# Patient Record
Sex: MALE | Race: WHITE | NOT HISPANIC OR LATINO | Employment: OTHER | URBAN - METROPOLITAN AREA
[De-identification: names, ages, dates, MRNs, and addresses within clinical notes are randomized per-mention and may not be internally consistent; named-entity substitution may affect disease eponyms.]

---

## 2018-03-26 ENCOUNTER — OFFICE VISIT (OUTPATIENT)
Dept: URGENT CARE | Facility: CLINIC | Age: 43
End: 2018-03-26
Payer: COMMERCIAL

## 2018-03-26 VITALS
DIASTOLIC BLOOD PRESSURE: 90 MMHG | TEMPERATURE: 99.4 F | RESPIRATION RATE: 18 BRPM | HEART RATE: 112 BPM | OXYGEN SATURATION: 98 % | WEIGHT: 243 LBS | SYSTOLIC BLOOD PRESSURE: 140 MMHG | BODY MASS INDEX: 32.91 KG/M2 | HEIGHT: 72 IN

## 2018-03-26 DIAGNOSIS — L03.119 CELLULITIS AND ABSCESS OF HAND: Primary | ICD-10-CM

## 2018-03-26 DIAGNOSIS — L02.519 CELLULITIS AND ABSCESS OF HAND: Primary | ICD-10-CM

## 2018-03-26 PROCEDURE — 99203 OFFICE O/P NEW LOW 30 MIN: CPT | Performed by: FAMILY MEDICINE

## 2018-03-27 NOTE — PROGRESS NOTES
[facility]        NAME: Jonah Wong is a 43 y o  male  : 1975    MRN: [id number]  DATE: 2018  TIME: 8:39 AM    Assessment and Plan   Cellulitis and abscess of hand [L03 119, L02 [id number]]  1  Cellulitis and abscess of hand           Patient Instructions       Follow up with PCP in 3-5 days  Proceed to  ER if symptoms worsen  Chief Complaint     Chief Complaint   Patient presents with    Hand Swelling     Right hand infection, hand broken tooth and small finger lac  had a fever last night and today and his hand has a red hot rash going up his arm  History of Present Illness       Patient states that a few days ago he had a laceration at the tip of his right ring finger, now he has redness and swelling of his knuckle and a red michael progressing up the back of his hand  Review of Systems   Review of Systems   Constitutional: Negative  HENT: Negative  Eyes: Negative  Respiratory: Negative  Cardiovascular: Negative  Gastrointestinal: Negative  Musculoskeletal: Negative  Skin:        Redness and swelling of the right 4th MCP joint with the you red streak progressing up back in the hand  Current Medications     No current outpatient prescriptions on file  Current Allergies     Allergies as of 2018    (No Known Allergies)            The following portions of the patient's history were reviewed and updated as appropriate: allergies, current medications, past family history, past medical history, past social history, past surgical history and problem list      No past medical history on file  No past surgical history on file  No family history on file  Medications have been verified          Objective   /90 (BP Location: Right arm, Patient Position: Sitting, Cuff Size: Large)   Pulse (!) 112   Temp 99 4 °F (37 4 °C)   Resp 18   Ht 6' (1 829 m)   Wt 110 kg (243 lb)   SpO2 98%   BMI 32 96 kg/m²        Physical Exam Physical Exam   Constitutional: He appears well-developed and well-nourished  Cardiovascular: Normal rate and regular rhythm      Pulmonary/Chest: Effort normal and breath sounds normal    Skin:

## 2025-08-04 ENCOUNTER — HOSPITAL ENCOUNTER (EMERGENCY)
Facility: HOSPITAL | Age: 50
Discharge: HOME | End: 2025-08-05
Attending: EMERGENCY MEDICINE
Payer: COMMERCIAL

## 2025-08-04 VITALS
DIASTOLIC BLOOD PRESSURE: 111 MMHG | OXYGEN SATURATION: 98 % | TEMPERATURE: 97.5 F | HEART RATE: 98 BPM | SYSTOLIC BLOOD PRESSURE: 188 MMHG | RESPIRATION RATE: 18 BRPM

## 2025-08-04 DIAGNOSIS — L72.3 SEBACEOUS CYST: Primary | ICD-10-CM

## 2025-08-04 PROCEDURE — 99284 EMERGENCY DEPT VISIT MOD MDM: CPT | Performed by: EMERGENCY MEDICINE

## 2025-08-04 PROCEDURE — 10061 I&D ABSCESS COMP/MULTIPLE: CPT | Performed by: EMERGENCY MEDICINE

## 2025-08-04 PROCEDURE — 99282 EMERGENCY DEPT VISIT SF MDM: CPT

## 2025-08-04 RX ORDER — LIDOCAINE HYDROCHLORIDE AND EPINEPHRINE 10; 10 MG/ML; UG/ML
20 INJECTION, SOLUTION INFILTRATION; PERINEURAL ONCE
Status: COMPLETED | OUTPATIENT
Start: 2025-08-04 | End: 2025-08-04

## 2025-08-04 RX ORDER — SULFAMETHOXAZOLE AND TRIMETHOPRIM 800; 160 MG/1; MG/1
1 TABLET ORAL ONCE
Status: COMPLETED | OUTPATIENT
Start: 2025-08-04 | End: 2025-08-04

## 2025-08-04 RX ORDER — SULFAMETHOXAZOLE AND TRIMETHOPRIM 800; 160 MG/1; MG/1
1 TABLET ORAL 2 TIMES DAILY
Qty: 14 TABLET | Refills: 0 | Status: SHIPPED | OUTPATIENT
Start: 2025-08-04 | End: 2025-08-11

## 2025-08-04 RX ORDER — CEPHALEXIN 500 MG/1
500 CAPSULE ORAL EVERY 6 HOURS SCHEDULED
Qty: 40 CAPSULE | Refills: 0 | Status: SHIPPED | OUTPATIENT
Start: 2025-08-04 | End: 2025-08-14

## 2025-08-04 RX ADMIN — LIDOCAINE HYDROCHLORIDE,EPINEPHRINE BITARTRATE 20 ML: 10; .01 INJECTION, SOLUTION INFILTRATION; PERINEURAL at 23:47

## 2025-08-04 RX ADMIN — SULFAMETHOXAZOLE AND TRIMETHOPRIM 1 TABLET: 800; 160 TABLET ORAL at 23:45

## 2025-08-04 RX ADMIN — CEPHALEXIN 500 MG: 250 CAPSULE ORAL at 23:45

## 2025-08-05 ENCOUNTER — TELEPHONE (OUTPATIENT)
Age: 50
End: 2025-08-05

## 2025-08-08 ENCOUNTER — OFFICE VISIT (OUTPATIENT)
Age: 50
End: 2025-08-08
Payer: COMMERCIAL

## 2025-08-08 VITALS
TEMPERATURE: 96.7 F | HEIGHT: 71 IN | HEART RATE: 78 BPM | RESPIRATION RATE: 15 BRPM | BODY MASS INDEX: 32.34 KG/M2 | WEIGHT: 231 LBS | SYSTOLIC BLOOD PRESSURE: 139 MMHG | OXYGEN SATURATION: 97 % | DIASTOLIC BLOOD PRESSURE: 96 MMHG

## 2025-08-08 DIAGNOSIS — L72.0 RUPTURED EPIDERMAL CYST: Primary | ICD-10-CM

## 2025-08-08 PROCEDURE — 99204 OFFICE O/P NEW MOD 45 MIN: CPT | Performed by: SURGERY
